# Patient Record
Sex: FEMALE | Race: WHITE | Employment: FULL TIME | URBAN - METROPOLITAN AREA
[De-identification: names, ages, dates, MRNs, and addresses within clinical notes are randomized per-mention and may not be internally consistent; named-entity substitution may affect disease eponyms.]

---

## 2019-04-18 ENCOUNTER — APPOINTMENT (OUTPATIENT)
Dept: CT IMAGING | Age: 64
DRG: 066 | End: 2019-04-18
Payer: COMMERCIAL

## 2019-04-18 ENCOUNTER — HOSPITAL ENCOUNTER (INPATIENT)
Age: 64
LOS: 1 days | Discharge: HOME | DRG: 066 | End: 2019-04-19
Attending: EMERGENCY MEDICINE | Admitting: INTERNAL MEDICINE
Payer: COMMERCIAL

## 2019-04-18 DIAGNOSIS — I63.512 CEREBROVASCULAR ACCIDENT (CVA) DUE TO OCCLUSION OF LEFT MIDDLE CEREBRAL ARTERY (HCC): Primary | ICD-10-CM

## 2019-04-18 PROBLEM — I63.9 ACUTE CVA (CEREBROVASCULAR ACCIDENT) (HCC): Status: ACTIVE | Noted: 2019-04-18

## 2019-04-18 LAB
AMPHETAMINE SCREEN, URINE: NORMAL
ANION GAP SERPL CALCULATED.3IONS-SCNC: 13 MMOL/L (ref 3–16)
BARBITURATE SCREEN URINE: NORMAL
BENZODIAZEPINE SCREEN, URINE: NORMAL
BILIRUBIN URINE: NEGATIVE
BLOOD, URINE: NEGATIVE
BUN BLDV-MCNC: 15 MG/DL (ref 7–20)
CALCIUM SERPL-MCNC: 10.3 MG/DL (ref 8.3–10.6)
CANNABINOID SCREEN URINE: NORMAL
CHLORIDE BLD-SCNC: 98 MMOL/L (ref 99–110)
CLARITY: CLEAR
CO2: 26 MMOL/L (ref 21–32)
COCAINE METABOLITE SCREEN URINE: NORMAL
COLOR: YELLOW
CREAT SERPL-MCNC: <0.5 MG/DL (ref 0.6–1.2)
EKG ATRIAL RATE: 93 BPM
EKG DIAGNOSIS: NORMAL
EKG P AXIS: 22 DEGREES
EKG P-R INTERVAL: 152 MS
EKG Q-T INTERVAL: 380 MS
EKG QRS DURATION: 96 MS
EKG QTC CALCULATION (BAZETT): 472 MS
EKG R AXIS: -8 DEGREES
EKG T AXIS: 50 DEGREES
EKG VENTRICULAR RATE: 93 BPM
GFR AFRICAN AMERICAN: >60
GFR NON-AFRICAN AMERICAN: >60
GLUCOSE BLD-MCNC: 98 MG/DL (ref 70–99)
GLUCOSE URINE: NEGATIVE MG/DL
KETONES, URINE: NEGATIVE MG/DL
LEUKOCYTE ESTERASE, URINE: NEGATIVE
Lab: NORMAL
METHADONE SCREEN, URINE: NORMAL
MICROSCOPIC EXAMINATION: NORMAL
NITRITE, URINE: NEGATIVE
OPIATE SCREEN URINE: NORMAL
OXYCODONE URINE: NORMAL
PH UA: 6
PH UA: 6 (ref 5–8)
PHENCYCLIDINE SCREEN URINE: NORMAL
POTASSIUM REFLEX MAGNESIUM: 4.1 MMOL/L (ref 3.5–5.1)
PROPOXYPHENE SCREEN: NORMAL
PROTEIN UA: NEGATIVE MG/DL
SODIUM BLD-SCNC: 137 MMOL/L (ref 136–145)
SPECIFIC GRAVITY UA: 1.01 (ref 1–1.03)
TROPONIN: <0.01 NG/ML
URINE REFLEX TO CULTURE: NORMAL
URINE TYPE: NORMAL
UROBILINOGEN, URINE: 0.2 E.U./DL

## 2019-04-18 PROCEDURE — 1200000000 HC SEMI PRIVATE

## 2019-04-18 PROCEDURE — 80048 BASIC METABOLIC PNL TOTAL CA: CPT

## 2019-04-18 PROCEDURE — 70496 CT ANGIOGRAPHY HEAD: CPT

## 2019-04-18 PROCEDURE — 6370000000 HC RX 637 (ALT 250 FOR IP): Performed by: STUDENT IN AN ORGANIZED HEALTH CARE EDUCATION/TRAINING PROGRAM

## 2019-04-18 PROCEDURE — 84484 ASSAY OF TROPONIN QUANT: CPT

## 2019-04-18 PROCEDURE — 93005 ELECTROCARDIOGRAM TRACING: CPT | Performed by: EMERGENCY MEDICINE

## 2019-04-18 PROCEDURE — 6360000002 HC RX W HCPCS: Performed by: INTERNAL MEDICINE

## 2019-04-18 PROCEDURE — 70498 CT ANGIOGRAPHY NECK: CPT

## 2019-04-18 PROCEDURE — 6370000000 HC RX 637 (ALT 250 FOR IP): Performed by: INTERNAL MEDICINE

## 2019-04-18 PROCEDURE — 2580000003 HC RX 258: Performed by: INTERNAL MEDICINE

## 2019-04-18 PROCEDURE — 81003 URINALYSIS AUTO W/O SCOPE: CPT

## 2019-04-18 PROCEDURE — 83036 HEMOGLOBIN GLYCOSYLATED A1C: CPT

## 2019-04-18 PROCEDURE — 6360000004 HC RX CONTRAST MEDICATION: Performed by: EMERGENCY MEDICINE

## 2019-04-18 PROCEDURE — 99285 EMERGENCY DEPT VISIT HI MDM: CPT

## 2019-04-18 PROCEDURE — 70450 CT HEAD/BRAIN W/O DYE: CPT

## 2019-04-18 PROCEDURE — 80307 DRUG TEST PRSMV CHEM ANLYZR: CPT

## 2019-04-18 PROCEDURE — 36415 COLL VENOUS BLD VENIPUNCTURE: CPT

## 2019-04-18 RX ORDER — METOPROLOL TARTRATE 50 MG/1
50 TABLET, FILM COATED ORAL 2 TIMES DAILY
Status: ON HOLD | COMMUNITY
End: 2019-04-19 | Stop reason: SDUPTHER

## 2019-04-18 RX ORDER — HYDROCHLOROTHIAZIDE 25 MG/1
25 TABLET ORAL DAILY
COMMUNITY

## 2019-04-18 RX ORDER — ATORVASTATIN CALCIUM 40 MG/1
80 TABLET, FILM COATED ORAL ONCE
Status: COMPLETED | OUTPATIENT
Start: 2019-04-18 | End: 2019-04-18

## 2019-04-18 RX ORDER — NICOTINE 21 MG/24HR
1 PATCH, TRANSDERMAL 24 HOURS TRANSDERMAL DAILY
Status: DISCONTINUED | OUTPATIENT
Start: 2019-04-18 | End: 2019-04-19 | Stop reason: HOSPADM

## 2019-04-18 RX ORDER — LORAZEPAM 2 MG/ML
1 INJECTION INTRAMUSCULAR ONCE
Status: COMPLETED | OUTPATIENT
Start: 2019-04-19 | End: 2019-04-19

## 2019-04-18 RX ORDER — LISINOPRIL AND HYDROCHLOROTHIAZIDE 25; 20 MG/1; MG/1
1 TABLET ORAL DAILY
Status: ON HOLD | COMMUNITY
End: 2019-04-19 | Stop reason: HOSPADM

## 2019-04-18 RX ORDER — ATORVASTATIN CALCIUM 80 MG/1
80 TABLET, FILM COATED ORAL NIGHTLY
Status: DISCONTINUED | OUTPATIENT
Start: 2019-04-19 | End: 2019-04-19 | Stop reason: HOSPADM

## 2019-04-18 RX ORDER — LABETALOL 20 MG/4 ML (5 MG/ML) INTRAVENOUS SYRINGE
10 EVERY 10 MIN PRN
Status: DISCONTINUED | OUTPATIENT
Start: 2019-04-18 | End: 2019-04-19 | Stop reason: HOSPADM

## 2019-04-18 RX ORDER — SODIUM CHLORIDE 9 MG/ML
INJECTION, SOLUTION INTRAVENOUS CONTINUOUS
Status: DISPENSED | OUTPATIENT
Start: 2019-04-18 | End: 2019-04-19

## 2019-04-18 RX ORDER — ASPIRIN 81 MG/1
81 TABLET ORAL DAILY
Status: DISCONTINUED | OUTPATIENT
Start: 2019-04-18 | End: 2019-04-19 | Stop reason: HOSPADM

## 2019-04-18 RX ORDER — SODIUM CHLORIDE 9 MG/ML
INJECTION, SOLUTION INTRAVENOUS CONTINUOUS
Status: DISCONTINUED | OUTPATIENT
Start: 2019-04-18 | End: 2019-04-18

## 2019-04-18 RX ORDER — SODIUM CHLORIDE 0.9 % (FLUSH) 0.9 %
10 SYRINGE (ML) INJECTION EVERY 12 HOURS SCHEDULED
Status: DISCONTINUED | OUTPATIENT
Start: 2019-04-18 | End: 2019-04-19 | Stop reason: HOSPADM

## 2019-04-18 RX ORDER — SODIUM CHLORIDE 0.9 % (FLUSH) 0.9 %
10 SYRINGE (ML) INJECTION PRN
Status: DISCONTINUED | OUTPATIENT
Start: 2019-04-18 | End: 2019-04-19 | Stop reason: HOSPADM

## 2019-04-18 RX ORDER — ATORVASTATIN CALCIUM 40 MG/1
40 TABLET, FILM COATED ORAL NIGHTLY
Status: DISCONTINUED | OUTPATIENT
Start: 2019-04-18 | End: 2019-04-18

## 2019-04-18 RX ORDER — ASPIRIN 81 MG/1
324 TABLET, CHEWABLE ORAL ONCE
Status: COMPLETED | OUTPATIENT
Start: 2019-04-18 | End: 2019-04-18

## 2019-04-18 RX ORDER — ONDANSETRON 2 MG/ML
4 INJECTION INTRAMUSCULAR; INTRAVENOUS EVERY 6 HOURS PRN
Status: DISCONTINUED | OUTPATIENT
Start: 2019-04-18 | End: 2019-04-19 | Stop reason: HOSPADM

## 2019-04-18 RX ADMIN — ENOXAPARIN SODIUM 40 MG: 40 INJECTION SUBCUTANEOUS at 22:13

## 2019-04-18 RX ADMIN — ASPIRIN 81 MG 324 MG: 81 TABLET ORAL at 18:09

## 2019-04-18 RX ADMIN — Medication 10 ML: at 22:13

## 2019-04-18 RX ADMIN — ATORVASTATIN CALCIUM 80 MG: 40 TABLET, FILM COATED ORAL at 18:09

## 2019-04-18 RX ADMIN — IOPAMIDOL 80 ML: 755 INJECTION, SOLUTION INTRAVENOUS at 16:48

## 2019-04-18 RX ADMIN — SODIUM CHLORIDE: 9 INJECTION, SOLUTION INTRAVENOUS at 22:13

## 2019-04-18 ASSESSMENT — ENCOUNTER SYMPTOMS
WHEEZING: 1
TROUBLE SWALLOWING: 0
ABDOMINAL PAIN: 0
CONSTIPATION: 0
VOICE CHANGE: 0
COUGH: 1
CHEST TIGHTNESS: 0
SHORTNESS OF BREATH: 1
BACK PAIN: 1
DIARRHEA: 0
VOMITING: 0

## 2019-04-18 NOTE — ED NOTES
Report given to [facility], RN.       Isis Talbot RN  04/18/19 7112

## 2019-04-18 NOTE — ED PROVIDER NOTES
ED Attending Attestation Note     Date of evaluation: 4/18/2019    This patient was seen by the resident. I have seen and examined the patient, agree with the workup, evaluation, management and diagnosis. The care plan has been discussed. I have reviewed the ECG and concur with the resident's interpretation. My assessment reveals awake alert female who states she has difficulty speaking at times. This started 3 weeks ago.   On exam she is well fluent there is no eighth abrasion noted no pronator drift     Toni Dutton MD  04/18/19 [id number]

## 2019-04-18 NOTE — ED PROVIDER NOTES
1 [facility] ER  EMERGENCY DEPARTMENT ENCOUNTER          [facility] RESIDENT NOTE       Date of evaluation: 4/18/2019    Chief Complaint     Aphasia      History of Present Illness     Davida Denny is a 59 y.o. female w/ PMH of mitral valve dz, HTN, PVD, prediabetes, obesity, tobacco use and cervical + lumbar disc disease (w/o intervention) who presents complaining of a 3 week history of intermittent difficulty with word finding and concentration. Pt states that on 3/28 she had her first episode that lasted <1min. She describes the episodes as forgetting words and/or inability to say the word. Denies this happening previously. Has occurred multiple times since the 28th with most recent episode at work earlier this day. She has had no facial assymmetry, extremity weakness/numbness, or difficulty with ambulation/balance. No fevers, chills, N/V/D, cp, or palpitations. Endorses some SOA and L-arm \"tingling and shocking pains from neck to hand\". L-arm symptoms are chronic since she was diagnosed with cervical DDD. Review of Systems     Review of Systems   Constitutional: Negative for chills, fever and unexpected weight change. HENT: Negative for trouble swallowing and voice change. Eyes: Negative for visual disturbance. Respiratory: Positive for cough, shortness of breath and wheezing (intermittently). Negative for chest tightness. Cardiovascular: Negative for chest pain, palpitations and leg swelling. Gastrointestinal: Negative for abdominal pain, constipation, diarrhea and vomiting. Genitourinary: Negative for dysuria, frequency and urgency. Musculoskeletal: Positive for back pain (chronic ) and neck pain (chronic ). Negative for joint swelling and myalgias. Neurological: Positive for speech difficulty (intermittently ). Negative for dizziness, facial asymmetry, weakness, light-headedness, numbness and headaches. Psychiatric/Behavioral: Positive for decreased concentration.  Negative for agitation, confusion and sleep disturbance. The patient is nervous/anxious. Past Medical, Surgical, Family, and Social History     She has no past medical history on file. She has no past surgical history on file. Her family history is not on file. She     Medications     Previous Medications    No medications on file       Allergies     She is allergic to ciprofloxacin and sulfa antibiotics. Physical Exam     INITIAL VITALS: BP: (!) 240/92, Temp: 98.2 °F (36.8 °C), Pulse: 94, Resp: 24, SpO2: 98 %   Physical Exam   Constitutional: She is oriented to person, place, and time. She appears well-developed and well-nourished. No distress. HENT:   Head: Normocephalic and atraumatic. Right Ear: External ear normal.   Left Ear: External ear normal.   Eyes: Pupils are equal, round, and reactive to light. Conjunctivae and EOM are normal.   Neck: Normal range of motion. Neck supple. No JVD present. No thyromegaly present. Cardiovascular: Normal rate, regular rhythm, S1 normal, S2 normal, normal heart sounds and normal pulses. Exam reveals no gallop. No murmur heard. Pulmonary/Chest: Effort normal. She has no wheezes. She has rhonchi. She has no rales. Abdominal: Soft. Bowel sounds are normal. There is no hepatosplenomegaly. There is no tenderness. Musculoskeletal: Normal range of motion. She exhibits no edema, tenderness or deformity. Neurological: She is alert and oriented to person, place, and time. She has normal strength. No cranial nerve deficit or sensory deficit. She displays a negative Romberg sign. Strength 5/5 in all extremities, normal sensation to light touch throughout, no aphasia or dysarthria, no vision deficits, negative romberg, no pronator drift. Skin: Skin is warm and dry. Capillary refill takes less than 2 seconds. She is not diaphoretic. No erythema. Psychiatric: Her mood appears anxious. Her speech is rapid and/or pressured and tangential. She is hyperactive. carotid arteries with 30% stenosis of the left internal carotid artery bulbar segment and less than 30% stenosis of the right internal carotid artery by NASCET criteria   2. Dominant right vertebral artery. No significant vertebral artery stenosis. CTA HEAD:      ANTERIOR CIRCULATION: The intracranial internal carotid arteries are patent. There is calcification contributing to mild to moderate narrowing of the cavernous segments. The anterior cerebral arteries and the right middle cerebral artery patent. The left    M1 segment is patent. However, there is occlusion or high-grade stenosis of left M2 branch. POSTERIOR CIRCULATION: The intracranial vertebral arteries, basilar artery and branch vessels are patent. INTRACRANIAL VENOUS SYSTEM: No evidence for intracranial venous thrombosis. INTRACRANIAL HEMORRHAGE: None. VENTRICLES: Normal in size and configuration for age. BRAIN PARENCHYMA: No evidence for large territorial acute infarction. No intracranial mass effect. Subcentimeter low attenuation foci involving left cerebral white matter compatible with age indeterminate ischemia unchanged compared to the prior    noncontrast head CT. SKULL: No destructive osseous process or fracture. PARANASAL SINUSES / MASTOIDS: No acute sinusitis or mastoiditis. EXTRACRANIAL SOFT TISSUES: Normal.      IMPRESSION:      1. Occlusion or severe stenosis of left M2 branch origin   2. No large territorial acute infarction      CT Head WO Contrast   Final Result      Subtle patchy decreased attenuation change demonstrated in the left frontoparietal white matter, consistent with ischemia, of uncertain age. Correlate clinically. No other acute intracranial findings.           LABS:   Results for orders placed or performed during the hospital encounter of 85/59/33   Basic Metabolic Panel w/ Reflex to MG   Result Value Ref Range    Sodium 137 136 - 145 mmol/L    Potassium reflex Magnesium 4.1 3.5 - 5.1 mmol/L    Chloride 98 (L) 99 - 110 mmol/L    CO2 26 21 - 32 mmol/L    Anion Gap 13 3 - 16    Glucose 98 70 - 99 mg/dL    BUN 15 7 - 20 mg/dL    CREATININE <0.5 (L) 0.6 - 1.2 mg/dL    GFR Non-African American >60 >60    GFR African American >60 >60    Calcium 10.3 8.3 - 10.6 mg/dL   Urinalysis Reflex to Culture   Result Value Ref Range    Color, UA Yellow Straw/Yellow    Clarity, UA Clear Clear    Glucose, Ur Negative Negative mg/dL    Bilirubin Urine Negative Negative    Ketones, Urine Negative Negative mg/dL    Specific Gravity, UA 1.015 1.005 - 1.030    Blood, Urine Negative Negative    pH, UA 6.0 5.0 - 8.0    Protein, UA Negative Negative mg/dL    Urobilinogen, Urine 0.2 <2.0 E.U./dL    Nitrite, Urine Negative Negative    Leukocyte Esterase, Urine Negative Negative    Microscopic Examination Not Indicated     Urine Reflex to Culture Not Indicated     Urine Type Voided    Troponin   Result Value Ref Range    Troponin <0.01 <0.01 ng/mL   Urine Drug Screen   Result Value Ref Range    Amphetamine Screen, Urine Neg Negative <1000ng/mL    Barbiturate Screen, Ur Neg Negative <200 ng/mL    Benzodiazepine Screen, Urine Neg Negative <200 ng/mL    Cannabinoid Scrn, Ur Neg Negative <50 ng/mL    Cocaine Metabolite Screen, Urine Neg Negative <300 ng/mL    Opiate Scrn, Ur Neg Negative <300 ng/mL    PCP Screen, Urine Neg Negative <25 ng/mL    Methadone Screen, Urine Neg Negative <300 ng/mL    Propoxyphene Scrn, Ur Neg Negative <300 ng/mL    pH, UA 6.0     Drug Screen Comment: see below     Oxycodone Urine Neg Negative <100 ng/ml   EKG 12 Lead   Result Value Ref Range    Ventricular Rate 93 BPM    Atrial Rate 93 BPM    P-R Interval 152 ms    QRS Duration 96 ms    Q-T Interval 380 ms    QTc Calculation (Bazett) 472 ms    P Axis 22 degrees    R Axis -8 degrees    T Axis 50 degrees    Diagnosis       EKG performed in ER and to be interpreted by ER physician. Confirmed by MD, ER ([id number]),  Chad Martin ([phone]) on MEDICATIONS:  New Prescriptions    No medications on file       Anita Neil 172, DO  Resident  04/18/19 6202

## 2019-04-18 NOTE — ED TRIAGE NOTES
PT ARRIVED TO ed WITH MULTIPLE COMPLAINTS.  PT STATES IT STARTED 3 WEEK AGO AND HAS BEEN INCREASING IN FREQUENCY-SHE IS HAVING MEMORY PROBLEMS AND CANNOT GET HER WORDS OUT

## 2019-04-18 NOTE — FLOWSHEET NOTE
04/18/19 1533   Encounter Summary   Services provided to: Patient   Referral/Consult From: Rounding   Continue Visiting   (Seen 4/18/19, AYLIN. )   Complexity of Encounter Moderate   Length of Encounter 15 minutes   Routine   Type Initial   Assessment Approachable   Intervention Nurtured hope   Outcome Expressed gratitude

## 2019-04-18 NOTE — H&P
Hospital Medicine History & Physical      PCP: Aleida Fernandez MD    Date of Admission: 4/18/2019    Date of Service: Pt seen/examined on 4/18/2019 and Admitted to Inpatient with expected LOS greater than two midnights due to medical therapy. Chief Complaint:  Slurred speech      History Of Present Illness:    59 y.o. female with PMH of MVP, hx of endocarditis following a dental procedure at age of 24 years, HTN, PVD, prediabetes, obesity, tobacco use and severe cervical + lumbar disc disease ( which is inoperable)  with chronic BL leg weakness making her gait and ambulation abnormal, presents complaining of a 3 week history of intermittent difficulty with word finding and concentration. Pt states that on 3/28 she had her first episode that lasted <1min. She was in [employer] [facility] [employer] at a convention. She describes the episodes as forgetting words and/or inability to say the word. Has occurred multiple times since the 28th with most recent episode at work earlier today. Pt states it initially happened every week, and lasted for few seconds and now it happens more frequently and lasts longer. Pt was seen at [facility] few days ago, but was not able to see her PCP  She works as a  for [employer] and states that her speech is very important for her to keep the job, tearful    She has had no facial assymmetry, extremity weakness/numbness, or difficulty with ambulation/balance. No fevers, chills, N/V/D, chest pain, SOB, or palpitations. Endorses \"tingling and shocking pains from neck to left hand\", which started about 1-2 weeks ago, but according to the pt she has had \" bad neck\" due to severeare cervical DDD, which was resolved with wearing a soft neck collar and PT.     Pt was told that she has PVD     Smoker for many years, smokes > 10 cigarettes a day    Past Medical History:          Diagnosis Date    Diverticulitis        Past Surgical History:      No past surgical history on file.    Medications Prior to Admission:      Prior to Admission medications    Not on File       Allergies:  Ciprofloxacin and Sulfa antibiotics    Social History:    TOBACCO:   has no tobacco history on file. ETOH:   has no alcohol history on file. Family History:    Reviewed in detail and negative for DM, CAD, Cancer, CVA. Positive as follows:    No family history on file. REVIEW OF SYSTEMS:   Pertinent positives as noted in the HPI. All other systems reviewed and negative. PHYSICAL EXAM PERFORMED:    BP (!) 229/115   Pulse 96   Temp 98.2 °F (36.8 °C) (Oral)   Resp 17   Ht 5' 2\" (1.575 m)   Wt 194 lb (88 kg)   SpO2 96%   BMI 35.48 kg/m²     General appearance:  No apparent distress, appears stated age and cooperative. HEENT:  Normal cephalic, atraumatic without obvious deformity. Pupils equal, round, and reactive to light. Extra ocular muscles intact. Conjunctivae/corneas clear. Neck: Supple, with full range of motion. No jugular venous distention. Trachea midline. Respiratory:  Normal respiratory effort. Clear to auscultation, bilaterally without Rales/Wheezes/Rhonchi. Cardiovascular:  Regular rate and rhythm with normal S1/S2 without murmurs, rubs or gallops. Abdomen: Soft, non-tender, non-distended with normal bowel sounds. Musculoskeletal:  No clubbing, cyanosis or edema bilaterally. Full range of motion without deformity. Skin: Skin color, texture, turgor normal.  No rashes or lesions. Neurologic:  Neurovascularly intact without any focal sensory/motor deficits.  Cranial nerves: II-XII intact, grossly non-focal. Speech- normal most of the time during conversation except 1-2 times when she had difficulty what she wanted to express  Psychiatric:  Alert and oriented, thought content appropriate, normal insight  Capillary Refill: Brisk,< 3 seconds   Peripheral Pulses: +2 palpable, equal bilaterally       Labs:     No results for input(s): WBC, HGB, HCT, PLT in the last 72 hours. Recent Labs     04/18/19  1504      K 4.1   CL 98*   CO2 26   BUN 15   CREATININE <0.5*   CALCIUM 10.3     No results for input(s): AST, ALT, BILIDIR, BILITOT, ALKPHOS in the last 72 hours. No results for input(s): INR in the last 72 hours. Recent Labs     04/18/19  1504   TROPONINI <0.01       Urinalysis:      Lab Results   Component Value Date    NITRU Negative 04/18/2019    BLOODU Negative 04/18/2019    SPECGRAV 1.015 04/18/2019    GLUCOSEU Negative 04/18/2019       Radiology:     CXR: I have reviewed the CXR with the following interpretation: NA  EKG:  I have reviewed the EKG with the following interpretation: SR, normal rate and intervals, no acute ST-T changes    CTA NECK W CONTRAST   Final Result      1. Tortuous bilateral internal carotid arteries with 30% stenosis of the left internal carotid artery bulbar segment and less than 30% stenosis of the right internal carotid artery by NASCET criteria   2. Dominant right vertebral artery. No significant vertebral artery stenosis. CTA HEAD:      ANTERIOR CIRCULATION: The intracranial internal carotid arteries are patent. There is calcification contributing to mild to moderate narrowing of the cavernous segments. The anterior cerebral arteries and the right middle cerebral artery patent. The left    M1 segment is patent. However, there is occlusion or high-grade stenosis of left M2 branch. POSTERIOR CIRCULATION: The intracranial vertebral arteries, basilar artery and branch vessels are patent. INTRACRANIAL VENOUS SYSTEM: No evidence for intracranial venous thrombosis. INTRACRANIAL HEMORRHAGE: None. VENTRICLES: Normal in size and configuration for age. BRAIN PARENCHYMA: No evidence for large territorial acute infarction. No intracranial mass effect. Subcentimeter low attenuation foci involving left cerebral white matter compatible with age indeterminate ischemia unchanged compared to the prior    noncontrast head CT. SKULL: No destructive osseous process or fracture. PARANASAL SINUSES / MASTOIDS: No acute sinusitis or mastoiditis. EXTRACRANIAL SOFT TISSUES: Normal.      IMPRESSION:      1. Occlusion or severe stenosis of left M2 branch origin   2. No large territorial acute infarction      CTA HEAD W CONTRAST   Final Result      1. Tortuous bilateral internal carotid arteries with 30% stenosis of the left internal carotid artery bulbar segment and less than 30% stenosis of the right internal carotid artery by NASCET criteria   2. Dominant right vertebral artery. No significant vertebral artery stenosis. CTA HEAD:      ANTERIOR CIRCULATION: The intracranial internal carotid arteries are patent. There is calcification contributing to mild to moderate narrowing of the cavernous segments. The anterior cerebral arteries and the right middle cerebral artery patent. The left    M1 segment is patent. However, there is occlusion or high-grade stenosis of left M2 branch. POSTERIOR CIRCULATION: The intracranial vertebral arteries, basilar artery and branch vessels are patent. INTRACRANIAL VENOUS SYSTEM: No evidence for intracranial venous thrombosis. INTRACRANIAL HEMORRHAGE: None. VENTRICLES: Normal in size and configuration for age. BRAIN PARENCHYMA: No evidence for large territorial acute infarction. No intracranial mass effect. Subcentimeter low attenuation foci involving left cerebral white matter compatible with age indeterminate ischemia unchanged compared to the prior    noncontrast head CT. SKULL: No destructive osseous process or fracture. PARANASAL SINUSES / MASTOIDS: No acute sinusitis or mastoiditis. EXTRACRANIAL SOFT TISSUES: Normal.      IMPRESSION:      1. Occlusion or severe stenosis of left M2 branch origin   2.  No large territorial acute infarction      CT Head WO Contrast   Final Result      Subtle patchy decreased attenuation change demonstrated in the left frontoparietal white matter, consistent with ischemia, of uncertain age. Correlate clinically. No other acute intracranial findings. ASSESSMENT:    Active Hospital Problems    Diagnosis Date Noted    Acute CVA (cerebrovascular accident) ([facility] [address] [I63.9] 04/18/2019   Sub acute CVA given her symptoms started 3 weeks ago  Left frontoparietal ischemic stroke of undetermined age  Occlusion or severe stenosis of left M2 branch origin  HTN  PVD - likely 2 to tob  Pre-diabetes  Tobacco abuse  Obesity  Severe lumbar and cervical spine DDD    PLAN:  Since symptom on set is 3 weeks ago, not a t-PA candidate or IV heparin   mg stat given  Continue ASA daily and start on statins  Lipid panel AM  MRI brain, ECHO in AM  Neurology consult  PT/OT/SLP  Maintain BP ~ 180/90  IV prn Labetalol with parameters for BP  Continue her home BP medications, hold HCTZ  Check A1C  OP work up for cervical radiculopathy    DVT Prophylaxis: Lovenox  Diet: Diet NPO, After Midnight  DIET CARDIAC;  Code Status: Full Code    PT/OT Eval Status: Yes    Dana Kerr MD    Thank you Dieter Bonds MD for the opportunity to be involved in this patient's care. If you have any questions or concerns please feel free to contact me at [phone].

## 2019-04-18 NOTE — ED NOTES
Pt hooked up to monitor, went into room and pt had taken her BP cuff off and pulse ox. Pt walking around room calling her children. No signs of distress, pt educated on monitor and still takes it off because the bp cuff \"gets too tight. \"      Ira Du RN  04/18/19 [id number]

## 2019-04-19 ENCOUNTER — APPOINTMENT (OUTPATIENT)
Dept: CT IMAGING | Age: 64
DRG: 066 | End: 2019-04-19
Payer: COMMERCIAL

## 2019-04-19 ENCOUNTER — APPOINTMENT (OUTPATIENT)
Dept: MRI IMAGING | Age: 64
DRG: 066 | End: 2019-04-19
Payer: COMMERCIAL

## 2019-04-19 ENCOUNTER — APPOINTMENT (OUTPATIENT)
Dept: GENERAL RADIOLOGY | Age: 64
DRG: 066 | End: 2019-04-19
Payer: COMMERCIAL

## 2019-04-19 VITALS
HEART RATE: 71 BPM | WEIGHT: 194 LBS | DIASTOLIC BLOOD PRESSURE: 75 MMHG | RESPIRATION RATE: 16 BRPM | HEIGHT: 62 IN | TEMPERATURE: 97.9 F | SYSTOLIC BLOOD PRESSURE: 130 MMHG | OXYGEN SATURATION: 96 % | BODY MASS INDEX: 35.7 KG/M2

## 2019-04-19 PROBLEM — Z72.0 TOBACCO ABUSE: Status: ACTIVE | Noted: 2019-04-19

## 2019-04-19 PROBLEM — E78.5 HYPERLIPEMIA: Status: ACTIVE | Noted: 2019-04-19

## 2019-04-19 PROBLEM — I10 PRIMARY HYPERTENSION: Status: ACTIVE | Noted: 2019-04-19

## 2019-04-19 LAB
CHOLESTEROL, TOTAL: 244 MG/DL (ref 0–199)
ESTIMATED AVERAGE GLUCOSE: 125.5 MG/DL
HBA1C MFR BLD: 6 %
HDLC SERPL-MCNC: 58 MG/DL (ref 40–60)
LDL CHOLESTEROL CALCULATED: 164 MG/DL
LV EF: 58 %
LVEF MODALITY: NORMAL
TRIGL SERPL-MCNC: 110 MG/DL (ref 0–150)
VLDLC SERPL CALC-MCNC: 22 MG/DL

## 2019-04-19 PROCEDURE — 97530 THERAPEUTIC ACTIVITIES: CPT

## 2019-04-19 PROCEDURE — 0042T CT BRAIN PERFUSION: CPT

## 2019-04-19 PROCEDURE — 97116 GAIT TRAINING THERAPY: CPT

## 2019-04-19 PROCEDURE — 6360000002 HC RX W HCPCS: Performed by: INTERNAL MEDICINE

## 2019-04-19 PROCEDURE — 6370000000 HC RX 637 (ALT 250 FOR IP): Performed by: INTERNAL MEDICINE

## 2019-04-19 PROCEDURE — 97165 OT EVAL LOW COMPLEX 30 MIN: CPT

## 2019-04-19 PROCEDURE — 6360000004 HC RX CONTRAST MEDICATION: Performed by: INTERNAL MEDICINE

## 2019-04-19 PROCEDURE — 99254 IP/OBS CNSLTJ NEW/EST MOD 60: CPT | Performed by: INTERNAL MEDICINE

## 2019-04-19 PROCEDURE — 92610 EVALUATE SWALLOWING FUNCTION: CPT

## 2019-04-19 PROCEDURE — 97161 PT EVAL LOW COMPLEX 20 MIN: CPT

## 2019-04-19 PROCEDURE — 72040 X-RAY EXAM NECK SPINE 2-3 VW: CPT

## 2019-04-19 PROCEDURE — 97535 SELF CARE MNGMENT TRAINING: CPT

## 2019-04-19 PROCEDURE — 36415 COLL VENOUS BLD VENIPUNCTURE: CPT

## 2019-04-19 PROCEDURE — 6370000000 HC RX 637 (ALT 250 FOR IP): Performed by: PSYCHIATRY & NEUROLOGY

## 2019-04-19 PROCEDURE — C8929 TTE W OR WO FOL WCON,DOPPLER: HCPCS

## 2019-04-19 PROCEDURE — 80061 LIPID PANEL: CPT

## 2019-04-19 PROCEDURE — 70551 MRI BRAIN STEM W/O DYE: CPT

## 2019-04-19 RX ORDER — LISINOPRIL 20 MG/1
20 TABLET ORAL DAILY
Status: DISCONTINUED | OUTPATIENT
Start: 2019-04-19 | End: 2019-04-19 | Stop reason: HOSPADM

## 2019-04-19 RX ORDER — NICOTINE 21 MG/24HR
1 PATCH, TRANSDERMAL 24 HOURS TRANSDERMAL DAILY
Qty: 30 PATCH | Refills: 3 | Status: SHIPPED | OUTPATIENT
Start: 2019-04-19

## 2019-04-19 RX ORDER — ROSUVASTATIN CALCIUM 20 MG/1
20 TABLET, COATED ORAL NIGHTLY
Qty: 30 TABLET | Refills: 1 | Status: SHIPPED | OUTPATIENT
Start: 2019-04-19

## 2019-04-19 RX ORDER — CLOPIDOGREL 300 MG/1
600 TABLET, FILM COATED ORAL ONCE
Status: COMPLETED | OUTPATIENT
Start: 2019-04-19 | End: 2019-04-19

## 2019-04-19 RX ORDER — CLOPIDOGREL BISULFATE 75 MG/1
75 TABLET ORAL DAILY
Qty: 30 TABLET | Refills: 3 | Status: SHIPPED | OUTPATIENT
Start: 2019-04-20

## 2019-04-19 RX ORDER — ASPIRIN 81 MG/1
81 TABLET ORAL DAILY
Qty: 30 TABLET | Refills: 3 | Status: SHIPPED | OUTPATIENT
Start: 2019-04-20

## 2019-04-19 RX ORDER — METOPROLOL TARTRATE 50 MG/1
25 TABLET, FILM COATED ORAL 2 TIMES DAILY
Qty: 60 TABLET | Refills: 3 | Status: SHIPPED | OUTPATIENT
Start: 2019-04-19

## 2019-04-19 RX ORDER — CLOPIDOGREL BISULFATE 75 MG/1
75 TABLET ORAL DAILY
Status: DISCONTINUED | OUTPATIENT
Start: 2019-04-20 | End: 2019-04-19 | Stop reason: HOSPADM

## 2019-04-19 RX ORDER — LISINOPRIL 20 MG/1
20 TABLET ORAL DAILY
Qty: 30 TABLET | Refills: 3 | Status: SHIPPED | OUTPATIENT
Start: 2019-04-20

## 2019-04-19 RX ADMIN — ENOXAPARIN SODIUM 40 MG: 40 INJECTION SUBCUTANEOUS at 12:52

## 2019-04-19 RX ADMIN — LISINOPRIL 20 MG: 20 TABLET ORAL at 08:54

## 2019-04-19 RX ADMIN — METOPROLOL TARTRATE 25 MG: 25 TABLET ORAL at 04:47

## 2019-04-19 RX ADMIN — METOPROLOL TARTRATE 25 MG: 25 TABLET ORAL at 08:54

## 2019-04-19 RX ADMIN — LORAZEPAM 1 MG: 2 INJECTION INTRAMUSCULAR; INTRAVENOUS at 11:13

## 2019-04-19 RX ADMIN — ASPIRIN 81 MG: 81 TABLET, COATED ORAL at 12:56

## 2019-04-19 RX ADMIN — IOPAMIDOL 45 ML: 755 INJECTION, SOLUTION INTRAVENOUS at 15:11

## 2019-04-19 RX ADMIN — CLOPIDOGREL BISULFATE 600 MG: 300 TABLET, FILM COATED ORAL at 12:56

## 2019-04-19 SDOH — HEALTH STABILITY: MENTAL HEALTH: HOW OFTEN DO YOU HAVE A DRINK CONTAINING ALCOHOL?: MONTHLY OR LESS

## 2019-04-19 NOTE — PROGRESS NOTES
Speech Language Pathology  Facility/Department: [id number] [facility] EVALUATION / DISCHARGE    NAME: Mando Kiser  : 1955  MRN: [id number]    ADMISSION DATE: 2019  ADMITTING DIAGNOSIS: has Acute CVA (cerebrovascular accident) ([address] Primary hypertension; Hyperlipemia; and Tobacco abuse on their problem list.  ONSET DATE: 19    Recent Chest Xray/CT of Chest: None    Date of Eval: 2019  Evaluating Therapist: Lorna Flores    Current Diet level:  Current Diet : Regular  Current Liquid Diet : Thin    Primary Complaint :  Difficult speaking. Pain: Denied at time of session       Reason for Referral  Mando Kiser was referred for a bedside swallow evaluation to assess the efficiency of her swallow function, identify signs and symptoms of aspiration and make recommendations regarding safe dietary consistencies, effective compensatory strategies, and safe eating environment. Impression  Dysphagia Diagnosis: Swallow function appears grossly intact  Dysphagia Impression : No s/s of dysphagia or aspiration. Patient did not complete 3 ounce water test as she continued to stop drinking despite directions. Patient presents with aphasia. Recommend OP speech therapy. Remyve sent to MD re: need for SLP Eval and Treat order for language eval.   Dysphagia Outcome Severity Scale: Level 7: Normal in all situations     Treatment Plan  Requires SLP Intervention: No  Duration/Frequency of Treatment: N/A  D/C Recommendations: Home independently     Recommended Diet and Intervention  Diet Solids Recommendation: Regular  Liquid Consistency Recommendation:  Thin  Recommended Form of Meds: PO     Compensatory Swallowing Strategies: None indicated      Treatment/Goals  Short-term Goals  Timeframe for Short-term Goals: Pt goal: To go home  Long-term Goals  Timeframe for Long-term Goals: N/A    General  Chart Reviewed: Yes  Comments: Pt admitted yesterday secondary to few weeks of difficulty speaking. Imaging revealed thromboembolic infarctions involving left cerebral white matter. Behavior/Cognition: Alert; Cooperative;Pleasant mood;Agitated  Respiratory Status: Room air  O2 Device: None (Room air)  Communication Observation: Aphasia(acute aphasia)  Follows Directions: Simple  Dentition: Adequate; Some missing teeth  Patient Positioning: Upright in bed  Baseline Vocal Quality: Normal  Volitional Cough: Congested  Volitional Swallow: (reduced laryngeal elevation on palpation)  Prior Dysphagia History: None per patient; restricts some things due to missing dentition  Consistencies Administered: Mechanical soft; Thin - cup; Thin - straw    Oral Motor Deficits  Oral/Motor  Oral Motor: Within functional limits    Oral Phase Dysfunction  Oral Phase  Oral Phase: WFL     Indicators of Pharyngeal Phase Dysfunction   Pharyngeal Phase  Pharyngeal Phase: WFL    Prognosis  Individuals consulted  Consulted and agree with results and recommendations: Patient;RN    Education  Patient Education: Educated on rationale for BSE, relationship between dysphagia and CVA and dysphagia and consequential pneumonia, s/s of aspiration.    Patient Education Response: Verbalizes understanding  Safety Devices in place: Yes  Type of devices: Call light within reach       Therapy Time  SLP Total Treatment Time  Timed Code Treatment Minutes: 0 Minutes  Total Treatment Time: RUSSELL Freire Travisfort  Speech-Language Pathologist

## 2019-04-19 NOTE — PROGRESS NOTES
Pt was threatening to leave AMA and was very upset. Now has been D/Allen and is very pleasant and cooperative. Pt verbalized understanding of D/C and medication instructions. Condition good and goals met at D/C.

## 2019-04-19 NOTE — PLAN OF CARE
Bedside swallow evaluation completed. Please see report in EMR.      Constantino Bryan M.A., Nik  Speech-Language Pathologist

## 2019-04-19 NOTE — CONSULTS
Neurology Consult Note  Reason for Consult: stroke  Chief complaint: speech difficulty  Dr Jose Guadalupe Chaudhari MD asked me to see Frank Benavidez in consultation for evaluation of stroke    History of Present Illness:  Frank Benavidez is a 59 y.o. female who presents with speech difficulty noted 3/28 while at a conference approximatly 5 minutes of talking incorrectly. This resolved but she didn't feel back to normal the entire next day. Last week she had another episode that resolved. On wed she had 45 minute event where she couldn't speak without weakness. This improved but didn't resolve. She doesn't feel like she fully recovered from this. She was not taking asa or statin. She reports statin intolerance. She takes BP meds and reports refractory hypertension as well as pre-eclampsia. Medical History:  Past Medical History:   Diagnosis Date    DDD (degenerative disc disease), cervical     Diverticulitis     Hypertension     PVD (peripheral vascular disease) ([facility] [address]      No past surgical history on file. Medications Prior to Admission: DICLOFENAC PO, Take 50 mg by mouth 3 times daily as needed  hydrochlorothiazide (HYDRODIURIL) 25 MG tablet, Take 25 mg by mouth daily  lisinopril-hydrochlorothiazide (PRINZIDE;ZESTORETIC) 20-25 MG per tablet, Take 1 tablet by mouth daily Pt states she only takes half a pill once a day. metoprolol tartrate (LOPRESSOR) 50 MG tablet, Take 50 mg by mouth 2 times daily Pt takes half a pill 2x a day. So 25 mg BID. Allergies   Allergen Reactions    Ciprofloxacin     Sulfa Antibiotics      No family history on file. Social History     Tobacco Use   Smoking Status Not on file     Social History     Substance and Sexual Activity   Drug Use Not on file     Social History     Substance and Sexual Activity   Alcohol Use Not on file       ROS:  Constitutional- No weight loss or fevers  Eyes- No diplopia. No photophobia.   Ears/nose/throat- No dysphagia. + Dysarthria  Cardiovascular- No palpitations. No chest pain  Respiratory- No dyspnea. No Cough  Gastrointestinal- No Abdominal pain. No Vomiting. Genitourinary- No incontinence. No urinary retention  Musculoskeletal- No myalgia. No arthralgia  Skin- No rash. No easy bruising. Psychiatric- No depression. No anxiety  Endocrine- No diabetes. No thyroid issues. Hematologic- No bleeding difficulty. No fatigue  Neurologic- No weakness. No Headache. Exam:  Blood pressure (!) 197/78, pulse 70, temperature 97.8 °F (36.6 °C), temperature source Oral, resp. rate 16, height 5' 2\" (1.575 m), weight 194 lb (88 kg), SpO2 98 %. Constitutional    Vital signs: BP, HR, and RR reviewed   General Alert, no distress, well-nourished  Eyes: fundoscopic exam revealed no hemorrhage   Cardiovascular: pulses symmetric in all 4 extremities. No peripheral edema. Psychiatric: cooperative with examination, no  psychotic behavior noted. Neurologic  Mental status:   orientation to person and place   General fund of knowledge grossly intact   Memory grossly intact   Attention intact as able to attend well to the exam     Language fluent in conversation. Able to repeat sentence and name wrist watch. Comprehension intact; follows simple commands  Cranial nerves:   CN2: Visual Fields full w/o extinction on confrontational testing,   CN 3,4,6: extraocular muscles intact,  CN5: facial sensation symmetric   CN7:face symmetric without dysarthria,   CN8: hearing grossly intact  CN9: palate elevated symmetrically  CN11: trap full strength on shoulder shrug  CN12: tongue midline with protrusion  Strength: No prontator drift in upper limbs. 4/5 in lower limbs bilaterally that is somewhat effort dependent. Deep tendon reflexes: normal in all 4 extremities  Sensory: light touch intact in all 4 extremities.   No sensory extinction on double simultaneous stimulation  Cerebellar/coordination: finger nose finger normal without ataxia  Tone: normal in all 4 extremities  Gait: deferred for safety      Labs  98 glucose    Utox negative    Studies    Head CT              Impression:  Perlita North is a 59 y.o. female who presented with recurrent left hemisphere TIA and stroke noted on CT with left carotid stenosis with soft plaque and calcifications that is not severe as well as filling defect in proximal left M2. She is very high risk recurrent stroke. Recommendations:  -Plavix load today 600 mg followed by 75 daily with asa 81 daily for 90 days followed by asa 325 or plavix 75 daily monotherapy after 90 days  -I strongly encouraged statin at least for few months as well as smoking cessation  -[place] BP for now.   Only treat greater than 220/120 for the next 24 hours and then slowly lower pending exam.    A copy of this note was provided for MD Isaac Pope MD  [phone]  Evenings, weekends, and off weeks please discuss neurologist on-call    CC=45 min

## 2019-04-19 NOTE — PLAN OF CARE
Problem: Pain:  Goal: Pain level will decrease  Description  Pt resting comfortably, has chronic back pain. Outcome: Ongoing     Problem: Falls - Risk of:  Goal: Will remain free from falls  Description  Fall precautions in place. Bed is in lowest position, wheels locked, alarm on, non-skid socks on. Call light and bedside table within reach. Patient calls out appropriately. Patient is up x1 assist. Will continue to assess and monitor. Outcome: Ongoing     Problem: COMMUNICATION IMPAIRMENT  Goal: Ability to express needs and understand communication  Pt has some slight expressive aphasia, slight problems word findings, pt communicates well.   Outcome: Ongoing

## 2019-04-19 NOTE — PROGRESS NOTES
Hospitalist Progress Note      PCP: Miguel Angel Gibbs MD    Date of Admission: 4/18/2019    Chief Complaint: trouble finding words    Hospital Course: admitted with expressive aphasia. Left hemispheric CVA diagnosed. Workup in process. Neurologic abnormalities noted to resolve    Subjective: no more aphasia. No chest pain, no shortness of breath, no nausea or vomiting        Medications:  Reviewed    Infusion Medications   Scheduled Medications    lisinopril  20 mg Oral Daily    metoprolol tartrate  25 mg Oral BID    [START ON 4/20/2019] clopidogrel  75 mg Oral Daily    sodium chloride flush  10 mL Intravenous 2 times per day    enoxaparin  40 mg Subcutaneous Daily    aspirin  81 mg Oral Daily    atorvastatin  80 mg Oral Nightly    nicotine  1 patch Transdermal Daily     PRN Meds: sodium chloride flush, magnesium hydroxide, ondansetron, labetalol      Intake/Output Summary (Last 24 hours) at 4/19/2019 1412  Last data filed at 4/18/2019 2338  Gross per 24 hour   Intake 480 ml   Output --   Net 480 ml       Physical Exam Performed:    BP (!) 155/112   Pulse 69   Temp 98.3 °F (36.8 °C) (Oral)   Resp 16   Ht 5' 2\" (1.575 m)   Wt 194 lb (88 kg)   SpO2 96%   BMI 35.48 kg/m²     General appearance: No apparent distress, appears stated age and cooperative. HEENT: Pupils equal, round, and reactive to light. Conjunctivae/corneas clear. Neck: Supple, with full range of motion. No jugular venous distention. Trachea midline. Respiratory:  Normal respiratory effort. Clear to auscultation, bilaterally without Rales/Wheezes/Rhonchi. Cardiovascular: Regular rate and rhythm with normal S1/S2 without murmurs, rubs or gallops. Abdomen: Soft, non-tender, non-distended with normal bowel sounds. Musculoskeletal: No clubbing, cyanosis or edema bilaterally. Full range of motion without deformity. Skin: Skin color, texture, turgor normal.  No rashes or lesions.   Neurologic:  Neurovascularly intact without any focal sensory/motor deficits. Cranial nerves: II-XII intact, grossly non-focal.  Psychiatric: Alert and oriented, thought content appropriate, normal insight  Capillary Refill: Brisk,< 3 seconds   Peripheral Pulses: +2 palpable, equal bilaterally       Labs:   No results for input(s): WBC, HGB, HCT, PLT in the last 72 hours. Recent Labs     04/18/19  1504      K 4.1   CL 98*   CO2 26   BUN 15   CREATININE <0.5*   CALCIUM 10.3     No results for input(s): AST, ALT, BILIDIR, BILITOT, ALKPHOS in the last 72 hours. No results for input(s): INR in the last 72 hours. Recent Labs     04/18/19  1504   TROPONINI <0.01       Urinalysis:      Lab Results   Component Value Date    NITRU Negative 04/18/2019    BLOODU Negative 04/18/2019    SPECGRAV 1.015 04/18/2019    GLUCOSEU Negative 04/18/2019       Radiology:  MRI BRAIN WO CONTRAST   Final Result      1. Multiple acute thromboembolic infarctions involving left cerebral white matter, predominantly deep watershed territories. 2. No intracranial hemorrhage or mass               XR CERVICAL SPINE (2-3 VIEWS)   Final Result      Extensive multilevel degenerative disc disease and facet arthropathy. CTA NECK W CONTRAST   Final Result      1. Tortuous bilateral internal carotid arteries with 30% stenosis of the left internal carotid artery bulbar segment and less than 30% stenosis of the right internal carotid artery by NASCET criteria   2. Dominant right vertebral artery. No significant vertebral artery stenosis. CTA HEAD:      ANTERIOR CIRCULATION: The intracranial internal carotid arteries are patent. There is calcification contributing to mild to moderate narrowing of the cavernous segments. The anterior cerebral arteries and the right middle cerebral artery patent. The left    M1 segment is patent. However, there is occlusion or high-grade stenosis of left M2 branch.       POSTERIOR CIRCULATION: The intracranial vertebral arteries, basilar artery and branch vessels are patent. INTRACRANIAL VENOUS SYSTEM: No evidence for intracranial venous thrombosis. INTRACRANIAL HEMORRHAGE: None. VENTRICLES: Normal in size and configuration for age. BRAIN PARENCHYMA: No evidence for large territorial acute infarction. No intracranial mass effect. Subcentimeter low attenuation foci involving left cerebral white matter compatible with age indeterminate ischemia unchanged compared to the prior    noncontrast head CT. SKULL: No destructive osseous process or fracture. PARANASAL SINUSES / MASTOIDS: No acute sinusitis or mastoiditis. EXTRACRANIAL SOFT TISSUES: Normal.      IMPRESSION:      1. Occlusion or severe stenosis of left M2 branch origin   2. No large territorial acute infarction      CTA HEAD W CONTRAST   Final Result      1. Tortuous bilateral internal carotid arteries with 30% stenosis of the left internal carotid artery bulbar segment and less than 30% stenosis of the right internal carotid artery by NASCET criteria   2. Dominant right vertebral artery. No significant vertebral artery stenosis. CTA HEAD:      ANTERIOR CIRCULATION: The intracranial internal carotid arteries are patent. There is calcification contributing to mild to moderate narrowing of the cavernous segments. The anterior cerebral arteries and the right middle cerebral artery patent. The left    M1 segment is patent. However, there is occlusion or high-grade stenosis of left M2 branch. POSTERIOR CIRCULATION: The intracranial vertebral arteries, basilar artery and branch vessels are patent. INTRACRANIAL VENOUS SYSTEM: No evidence for intracranial venous thrombosis. INTRACRANIAL HEMORRHAGE: None. VENTRICLES: Normal in size and configuration for age. BRAIN PARENCHYMA: No evidence for large territorial acute infarction. No intracranial mass effect.  Subcentimeter low attenuation foci involving left cerebral white matter compatible with age indeterminate ischemia unchanged compared to the prior    noncontrast head CT. SKULL: No destructive osseous process or fracture. PARANASAL SINUSES / MASTOIDS: No acute sinusitis or mastoiditis. EXTRACRANIAL SOFT TISSUES: Normal.      IMPRESSION:      1. Occlusion or severe stenosis of left M2 branch origin   2. No large territorial acute infarction      CT Head WO Contrast   Final Result      Subtle patchy decreased attenuation change demonstrated in the left frontoparietal white matter, consistent with ischemia, of uncertain age. Correlate clinically. No other acute intracranial findings.       CT HEAD W CONTRAST    (Results Pending)           Assessment/Plan:    Active Hospital Problems    Diagnosis Date Noted    Primary hypertension [I10] 04/19/2019    Hyperlipemia [E78.5] 04/19/2019    Tobacco abuse [Z72.0] 04/19/2019    Acute CVA (ce[facility]brovascular accident) [facility]) [I63.9] 04/18/2019     PLAN:    Recurrent left hemisphere CVA  Seen by neurology  Started on ASA & Plavix  Started statin  Needs to stop smoking  PT/OT  Cardiology consulted for arrhythmia evaluation    Dyslipidemia  Started on statin    Tobacco abuse  Counseled to stop smoking    Expressive aphasia  Resolved     D/w patient, questions answered    DVT Prophylaxis: Lovenox  Diet: DIET GENERAL;  Code Status: Full Code    PT/OT Eval Status: in process    Dispo - inpatient, possible discharge in Melvina Ludwig MD

## 2019-04-19 NOTE — CONSULTS
[address]   Cardiac Electrophysiology Consultation   Date: 4/19/2019  Admit Date:  4/18/2019  Reason for Consultation: Cardiac monitor  Consult Requesting Physician: Christy Hewitt MD     Chief Complaint   Patient presents with    Aphasia     HPI: Perlita North is a 59 y.o. female with history of hypertension, peripheral vascular disease was admitted to the hospital secondary to recurrent aphasia. She was noted to have left carotid stenosis with soft plaque and calcifications. EP was consulted for evaluation of LINQ monitor. Past Medical History:   Diagnosis Date    DDD (degenerative disc disease), cervical     Diverticulitis     Hypertension     PVD (peripheral vascular disease) ([facility] [address]         No past surgical history on file. Allergies   Allergen Reactions    Ciprofloxacin     Sulfa Antibiotics        Social History:  Reviewed. reports that she has been smoking cigarettes. She has been smoking about 1.00 pack per day. She has never used smokeless tobacco. She reports that she drinks alcohol. She reports that she does not use drugs. Family History:  Reviewed. family history includes Coronary Art Dis in her father. No premature CAD. Review of System:  All other systems reviewed except for that noted above. Pertinent negatives and positives are:     Objective      · General: negative for fever, chills   · Ophthalmic ROS: negative for - eye pain or loss of vision  · ENT ROS: negative for - headaches, sore throat   · Respiratory: negative for - cough, sputum  · Cardiovascular: Reviewed in HPI  · Gastrointestinal: negative for - abdominal pain, diarrhea, N/V  · Hematology: negative for - bleeding, blood clots, bruising or jaundice  · Genito-Urinary:  negative for - Dysuria or incontinence  · Musculoskeletal: negative for - Joint swelling, muscle pain  · Neurological: negative for - confusion, dizziness, headaches   · Psychiatric: No anxiety, no depression.   · Dermatological: negative for - rash    Physical Examination:  Vitals:    19 1509   BP: 130/75   Pulse: 71   Resp: 16   Temp: 97.9 °F (36.6 °C)   SpO2: 96%        Intake/Output Summary (Last 24 hours) at 2019 1655  Last data filed at 2019 2338  Gross per 24 hour   Intake 480 ml   Output --   Net 480 ml     In: 240 [P.O.:240]  Out: -    Wt Readings from Last 3 Encounters:   19 194 lb (88 kg)     Temp  Av °F (36.7 °C)  Min: 97.7 °F (36.5 °C)  Max: 98.3 °F (36.8 °C)  Pulse  Av.7  Min: 61  Max: 96  BP  Min: 130/75  Max: 229/115  SpO2  Av.7 %  Min: 96 %  Max: 98 %    · Telemetry: Sinus rhythm   · Constitutional: Alert. Oriented to person, place, and time. No distress. · Head: Normocephalic and atraumatic. · Mouth/Throat: Lips appear moist. Oropharynx is clear and moist.  · Eyes: Conjunctivae normal. EOM are normal.   · Neck: Neck supple. No lymphadenopathy. No rigidity. No JVD present. · Cardiovascular: Normal rate, regular rhythm. Normal S1&S2. Carotid pulse 2+ bilaterally. · Pulmonary/Chest: Bilateral respiratory sounds present. No respiratory accessory muscle use. No wheezes, No rhonchi. · Abdominal: Soft. Normal bowel sounds present. No distension, No tenderness. No splenomegaly. No hernia. · Musculoskeletal: No tenderness. No edema    · Lymphadenopathy: Has no cervical adenopathy. · Neurological: Alert and oriented. Cranial nerve II-XII grossly intact, No gross deficit to touch. · Skin: Skin is warm and dry. No rash, lesions, ulcerations noted. · Psychiatric: No anxiety nor agitation. Labs:  Reviewed. Recent Labs     19  1504      K 4.1   CL 98*   CO2 26   BUN 15   CREATININE <0.5*     No results for input(s): WBC, HGB, HCT, MCV, PLT in the last 72 hours.   Lab Results   Component Value Date    TROPONINI <0.01 2019     No results found for: BNP  No results found for: PROTIME, INR  Lab Results   Component Value Date    CHOL 244 2019    HDL 58

## 2019-04-19 NOTE — PLAN OF CARE
Patient discharge orders placed  Cardiology signed off  Neurology recommendations in place    Full dc summary by primary attending

## 2019-04-19 NOTE — PROGRESS NOTES
Physical Therapy    Facility/Department: [facility] 5T ORTHO/NEURO  Initial Assessment/Treatment Note    NAME: Aicha Chaudhry  : 1955  MRN: [id number]    Date of Service: 2019    Discharge Recommendations:    Aicha Chaudhry scored a 22/24 on the AM-PAC short mobility form. Current research shows that an AM-PAC score of 18 or greater is typically associated with a discharge to the patient's home setting. Based on the patients AM-PAC score and their current functional mobility deficits, it is recommended that the patient have 2-3 sessions per week of Physical Therapy at d/c to increase the patients independence. HOME HEALTH CARE: LEVEL 1 STANDARD    - Initial home health evaluation to occur within 24-48 hours, in patient home   - Therapy to evaluate with goal of regaining prior level of functioning   - Therapy to evaluate if patient has [id number] needs for personal care    PT Equipment Recommendations  Equipment Needed: No    Assessment   Body structures, Functions, Activity limitations: Decreased functional mobility ; Decreased cognition;Decreased ADL status; Decreased endurance;Decreased strength;Decreased balance;Decreased safe awareness; Increased Pain  Assessment: Pt near baseline however demonstrates deficits with maintaining safe balance at this time. No LOB but increased lateral sway at times. Pt attributes this to lethargy due to meds she received recently. Currently pt able to perform bed mobility with modified independence, transfers with supervision, and gt/stair negotiation with SBA/CGA. She does present with decreased safety awareness and awareness of her deficits, which makes her require some assistance initially at home. Continue to follow in order to maximize independence with mobility. Pt reports main concern is difficulty with word finding/speech issues. Rec speech consult.   Discussed with RN who will discuss with MD.  Treatment Diagnosis: impaired gt 2/2 decreased safety Layout: One level  Home Access: Stairs to enter with rails(1 GIOVANNA)  Bathroom Shower/Tub: Tub/Shower unit  Bathroom Equipment: Hand-held shower  Home Equipment: [facility], Electric scooter, Cane, 4 wheeled walker  ADL Assistance: Independent(wears easy to slip on clothing)  Homemaking Assistance: Independent  Ambulation Assistance: Independent(with RW)  Transfer Assistance: Independent  Active : Yes  Occupation: Full time employment  Type of occupation:   Additional Comments: reports hitting her head on the freezer door a few weeks ago. Pt's answers inconsistent throughout treatement; states she uses a RW in her apartment, however also reports she only uses her RW when out for long periods of time  Cognition   Cognition  Overall Cognitive Status: Exceptions  Safety Judgement: Decreased awareness of need for safety(Impulsive)  Cognition Comment: Pt demonstrates decreased awareness of her deficits. Throughout treatment, pt required multiple cues to redirect and to correct errors she has made. Pt tangential with thoughts and perseverates on being in the hospital but would also abruptly change subjects regarding her deficits. Objective    AROM RLE (degrees)  RLE General AROM: Grossly WFL  AROM LLE (degrees)  LLE General AROM: Grossly WFL  Strength RLE  Comment: Grossly >3+/5 throughout as pt able to move funcitonally  Strength LLE  Comment: Grossly >3+/5 throughout as pt able to move funcitonally        Bed mobility  Supine to Sit: Modified independent(HOB elevated)  Transfers  Sit to Stand: Supervision(x2 trials (EOB, commode))  Stand to sit: Supervision(x2 trials (commode, recliner))  Ambulation  Ambulation?: Yes  More Ambulation?: Yes  Ambulation 1  Surface: level tile  Device: No Device  Assistance: Stand by assistance  Quality of Gait: unsteady but no overt LOBs. increased lateral sway throughout. decreased stride length and narrow ALVIN.   Distance: 10'x2 to/from note serves as a discharge note. Cris Fang, Student Physical Therapist    Therapist was present, directed the patient's care, made skilled judgment, and was responsible for assessment and treatment of the patient.     More Collins, PT, DPT  [phone]

## 2019-04-19 NOTE — CARE COORDINATION
2019  [facility])  Clinical Case Management Department    Spoke with patient regarding discharge plans and she is hoping to get back to work as soon as possible. She would like to do outpatient speech therapy upon discharge but needs a place that will be close to home. No DME needs. Patient: Vivian Sheffield  MRN: [id number] / : 1955  ACCT: [de-identified]          Admission Documentation  Attending Provider: Clarisse Elliott MD  Admit date/time: 2019  2:05 PM  Status: Inpatient [101]  Diagnosis: Acute CVA (cerebrovascular accident) [facility])     Readmission within last 30 days:  no     Living Situation  Discharge Planning  Living Arrangements: Alone  Support Systems: Spouse/Significant Other  Potential Assistance Needed: N/A  Type of Home Care Services: None  Patient expects to be discharged to[de-identified] home    Service Assessment       Values / Beliefs  Do you have any ethnic, cultural, sacramental, or spiritual Voodoo needs you would like us to be aware of while you are in the hospital?: No    Advance Directives (For Healthcare)  Pre-existing DNR Comfort Care/DNR Arrest/DNI Order: No  Healthcare Directive: No, patient does not have an advance directive for healthcare treatment  Information on Healthcare Directives Requested: No  Patient Requests Assistance: Yes, will do independently  Advance Directives: Pt. not interested at this time                        Destination  outpatient therapies    Durable Medical Equipment   No needs    Home Health/Skilled Nursing  Services at Discharge: Outpatient Speech Therapy 2x week  Home care at home?  No Provider: SURJIT Provider Phone NA    Therapy Consults  PT evaluation needed?: Yes (Comment)  OT Evalulation Needed?: Yes (Comment)  SLP evaluation needed?: No    Home Medical Care  NA  Pharmacy: none noted  Potential Assistance Purchasing Medications:  Yes  Does patient want to participate in local refill/meds to beds program?: Yes    Goals of Care  Patient expects to be

## 2019-04-19 NOTE — PROGRESS NOTES
Dr. Micki Hayward  Diagnosis: Acute CVA  Subjective  Subjective: Pt in bed upon entry. I do everything! Pain: Denies  Oxygen Therapy  SpO2: 96 %  O2 Device: None (Room air)  Social/Functional History  Social/Functional History  Lives With: Alone  Type of Home: Apartment(1st floor)  Home Layout: One level  Home Access: Stairs to enter with rails(1 GIOVANNA)  Bathroom Shower/Tub: Tub/Shower unit  Bathroom Equipment: Hand-held shower  Home Equipment: [employer], Electric scooter, Cane, 4 wheeled walker  ADL Assistance: Independent(wears easy to slip on clothing)  Homemaking Assistance: Independent  Ambulation Assistance: Independent(with RW)  Transfer Assistance: Independent  Active : Yes  Occupation: Full time employment  Type of occupation:   Additional Comments: reports hitting her head on the freezer door a few weeks ago.  Pt's answers inconsistent throughout treatement; states she uses a RW in her apartment, however also reports she only uses her RW when out for long periods of time       Objective   Vision: Impaired  Vision Exceptions: Wears glasses for reading  Hearing: Within functional limits    Orientation  Overall Orientation Status: Within Functional Limits     Balance  Sitting Balance: Independent  Standing Balance: Stand by assistance(to supervision)  Standing Balance  Time: ~4 min  Activity: bathroom mobility/activity, walk in florentino  Functional Mobility  Functional - Mobility Device: No device  Activity: To/from bathroom  Assist Level: Stand by assistance  Toilet Transfers  Toilet - Technique: Ambulating  Equipment Used: Standard toilet(grab bar)  Toilet Transfer: Stand by assistance  ADL  Grooming: Independent  LE Dressing: Modified independent   Toileting: Independent  Tone RUE  RUE Tone: Normotonic  Tone LUE  LUE Tone: Normotonic  Coordination  Movements Are Fluid And Coordinated: Yes     Bed mobility  Supine to Sit: Modified independent(HOB elevated)  Transfers  Stand Step Transfers: Stand by assistance     Cognition  Overall Cognitive Status: Exceptions  Safety Judgement: Decreased awareness of need for safety(Impulsive)  Cognition Comment: Pt demonstrates decreased awareness of her deficits. Throughout treatment, pt required multiple cues to redirect and to correct errors she has made. Pt tangential with thoughts and perseverates on being in the hospital but would also abruptly change subjects regarding her deficits. LUE AROM (degrees)  LUE AROM : WFL  RUE AROM (degrees)  RUE AROM : WFL                  Treatment included ADL and transfer training. Plan   Plan  Times per week: 2-5  Times per day: Daily  Current Treatment Recommendations: Functional Mobility Training, Endurance Training, Self-Care / ADL, Cognitive/Perceptual Training     If patient is discharged prior to next treatment, this note will serve as the discharge. AM-PAC Score        AM-PAC Inpatient Daily Activity Raw Score: 22  AM-PAC Inpatient ADL T-Scale Score : 47.1  ADL Inpatient CMS 0-100% Score: 25.8  ADL Inpatient CMS G-Code Modifier : CJ    Goals                             No goals met  Short term goals  Time Frame for Short term goals: Discharge  Short term goal 1: Transfer to/from toilet with modified independence  Short term goal 2: Modified independence for functional room mobility/mobility x5 min  Patient Goals   Patient goals : Go home. Work.        Therapy Time   Individual Concurrent Group Co-treatment   Time In 1213         Time Out 1353         Minutes 38         Timed Code Treatment Minutes: 28 Minutes    Total Treatment [address], OTR/L [id number]

## 2019-04-20 NOTE — DISCHARGE SUMMARY
Hospital Medicine Discharge Summary    Patient ID: Lukasz Machuca      Patient's PCP: Renny Wylie MD    Admit Date: 4/18/2019     Discharge Date: 4/19/2019      Admitting Physician: Tamika Harris MD     Discharge Physician: Marty Andrews MD     Discharge Diagnoses: Active Hospital Problems    Diagnosis Date Noted    Primary hypertension [I10] 04/19/2019    Hyperlipemia [E78.5] 04/19/2019    Tobacco abuse [Z72.0] 04/19/2019    Acute CVA (cerebrovascular accident[facility]) [I63.9] 04/18/2019       The patient was seen and examined on day of discharge and this discharge summary is in conjunction with any daily progress note from day of discharge. Hospital Course:     Admitted with expressive aphasia  Diagnosed with left hemispheric CVA  Was seen by neurology  Cardiology also consulted. No indication was seen for arrhythmia workup  Will be discharged home with prescriptions as below  Smoking cessation advice given        Physical Exam Performed:     /75   Pulse 71   Temp 97.9 °F (36.6 °C) (Oral)   Resp 16   Ht 5' 2\" (1.575 m)   Wt 194 lb (88 kg)   SpO2 96%   BMI 35.48 kg/m²       General appearance:  No apparent distress, appears stated age and cooperative. HEENT:  Normal cephalic, atraumatic without obvious deformity. Pupils equal, round, and reactive to light. Extra ocular muscles intact. Conjunctivae/corneas clear. Neck: Supple, with full range of motion. No jugular venous distention. Trachea midline. Respiratory:  Normal respiratory effort. Clear to auscultation, bilaterally without Rales/Wheezes/Rhonchi. Cardiovascular:  Regular rate and rhythm with normal S1/S2 without murmurs, rubs or gallops. Abdomen: Soft, non-tender, non-distended with normal bowel sounds. Musculoskeletal:  No clubbing, cyanosis or edema bilaterally. Full range of motion without deformity. Skin: Skin color, texture, turgor normal.  No rashes or lesions.   Neurologic:  Neurovascularly intact without the cavernous segments. The anterior cerebral arteries and the right middle cerebral artery patent. The left    M1 segment is patent. However, there is occlusion or high-grade stenosis of left M2 branch. POSTERIOR CIRCULATION: The intracranial vertebral arteries, basilar artery and branch vessels are patent. INTRACRANIAL VENOUS SYSTEM: No evidence for intracranial venous thrombosis. INTRACRANIAL HEMORRHAGE: None. VENTRICLES: Normal in size and configuration for age. BRAIN PARENCHYMA: No evidence for large territorial acute infarction. No intracranial mass effect. Subcentimeter low attenuation foci involving left cerebral white matter compatible with age indeterminate ischemia unchanged compared to the prior    noncontrast head CT. SKULL: No destructive osseous process or fracture. PARANASAL SINUSES / MASTOIDS: No acute sinusitis or mastoiditis. EXTRACRANIAL SOFT TISSUES: Normal.      IMPRESSION:      1. Occlusion or severe stenosis of left M2 branch origin   2. No large territorial acute infarction      CTA HEAD W CONTRAST   Final Result      1. Tortuous bilateral internal carotid arteries with 30% stenosis of the left internal carotid artery bulbar segment and less than 30% stenosis of the right internal carotid artery by NASCET criteria   2. Dominant right vertebral artery. No significant vertebral artery stenosis. CTA HEAD:      ANTERIOR CIRCULATION: The intracranial internal carotid arteries are patent. There is calcification contributing to mild to moderate narrowing of the cavernous segments. The anterior cerebral arteries and the right middle cerebral artery patent. The left    M1 segment is patent. However, there is occlusion or high-grade stenosis of left M2 branch. POSTERIOR CIRCULATION: The intracranial vertebral arteries, basilar artery and branch vessels are patent. INTRACRANIAL VENOUS SYSTEM: No evidence for intracranial venous thrombosis. R-3Normal              Details   hydrochlorothiazide (HYDRODIURIL) 25 MG tablet Take 25 mg by mouth dailyHistorical Med             Time Spent on discharge is more than 45 minutes in the examination, evaluation, counseling and review of medications and discharge plan. Signed:    Belkis Lynch MD   4/20/2019      Thank you Zeb Allan MD for the opportunity to be involved in this patient's care. If you have any questions or concerns please feel free to contact me at [phone].

## 2019-04-22 ENCOUNTER — TELEPHONE (OUTPATIENT)
Dept: INTERNAL MEDICINE CLINIC | Age: 64
End: 2019-04-22

## 2019-04-22 NOTE — TELEPHONE ENCOUNTER
Pt stated the following:  Needs a New appointment. Pt was discharge from [facility]. on 4/19/19.   Pt was referred by Hospital to Dr. Elyse Hsu  Please call

## 2022-09-14 DIAGNOSIS — R93.5 ABNORMAL CT OF THE ABDOMEN: ICD-10-CM

## 2022-09-14 DIAGNOSIS — I70.8 ATHEROSCLEROSIS OF AORTIC BIFURCATION AND COMMON ILIAC ARTERIES (HCC): Primary | ICD-10-CM

## 2022-09-14 DIAGNOSIS — I70.0 ATHEROSCLEROSIS OF AORTIC BIFURCATION AND COMMON ILIAC ARTERIES (HCC): Primary | ICD-10-CM

## 2023-03-30 ENCOUNTER — APPOINTMENT (RX ONLY)
Dept: URBAN - METROPOLITAN AREA CLINIC 170 | Facility: CLINIC | Age: 68
Setting detail: DERMATOLOGY
End: 2023-03-30

## 2023-03-30 DIAGNOSIS — L13.0 DERMATITIS HERPETIFORMIS: ICD-10-CM

## 2023-03-30 PROBLEM — L30.9 DERMATITIS, UNSPECIFIED: Status: ACTIVE | Noted: 2023-03-30

## 2023-03-30 PROCEDURE — ? BIOPSY BY PUNCH METHOD FOR DIF

## 2023-03-30 PROCEDURE — 11104 PUNCH BX SKIN SINGLE LESION: CPT

## 2023-03-30 PROCEDURE — ? ADDITIONAL NOTES

## 2023-03-30 PROCEDURE — 11105 PUNCH BX SKIN EA SEP/ADDL: CPT

## 2023-03-30 PROCEDURE — ? BIOPSY BY PUNCH METHOD

## 2023-03-30 ASSESSMENT — LOCATION SIMPLE DESCRIPTION DERM
LOCATION SIMPLE: RIGHT UPPER BACK
LOCATION SIMPLE: RIGHT BACK
LOCATION SIMPLE: LEFT KNEE

## 2023-03-30 ASSESSMENT — LOCATION DETAILED DESCRIPTION DERM
LOCATION DETAILED: RIGHT SUPERIOR UPPER BACK
LOCATION DETAILED: LEFT KNEE
LOCATION DETAILED: RIGHT SUPERIOR LATERAL UPPER BACK

## 2023-03-30 ASSESSMENT — LOCATION ZONE DERM
LOCATION ZONE: LEG
LOCATION ZONE: TRUNK

## 2023-03-30 NOTE — PROCEDURE: BIOPSY BY PUNCH METHOD
Detail Level: Detailed
Was A Bandage Applied: Yes
Punch Size In Mm: 4
Size Of Lesion In Cm (Optional): 0
Depth Of Punch Biopsy: dermis
Biopsy Type: H and E
Anesthesia Type: 2% lidocaine with epinephrine
Anesthesia Volume In Cc (Will Not Render If 0): 1
Hemostasis: Aluminum Chloride and Electrocautery
Epidermal Sutures: 4-0 Ethilon
Wound Care: Bacitracin
Dressing: bandage
Suture Removal: 14 days
Patient Will Remove Sutures At Home?: No
Lab: -102
Lab Facility: 3
Consent: Written consent was obtained and risks were reviewed including but not limited to scarring, infection, bleeding, scabbing, incomplete removal, nerve damage and allergy to anesthesia.
Post-Care Instructions: I reviewed with the patient in detail post-care instructions. Patient is to keep the biopsy site dry overnight, and then apply bacitracin twice daily until healed. Patient may apply hydrogen peroxide soaks to remove any crusting.
Home Suture Removal Text: Patient was provided a home suture removal kit and will remove their sutures at home.  If they have any questions or difficulties they will call the office.
Notification Instructions: Patient will be notified of biopsy results. However, patient instructed to call the office if not contacted within 2 weeks.
Billing Type: Third-Party Bill
Information: Selecting Yes will display possible errors in your note based on the variables you have selected. This validation is only offered as a suggestion for you. PLEASE NOTE THAT THE VALIDATION TEXT WILL BE REMOVED WHEN YOU FINALIZE YOUR NOTE. IF YOU WANT TO FAX A PRELIMINARY NOTE YOU WILL NEED TO TOGGLE THIS TO 'NO' IF YOU DO NOT WANT IT IN YOUR FAXED NOTE.

## 2023-03-30 NOTE — HPI: RASH
What Type Of Note Output Would You Prefer (Optional)?: Bullet Format
How Severe Is Your Rash?: mild
Is This A New Presentation, Or A Follow-Up?: Rash
Additional History: Patient seen pcp was diagnosed with shingles and scabbies and she only has taking valtrex which didnt help it .She doesn?t want to take the ivermectin, she has many food allergies which sometimes breaks out in hives. She used triam and she said made worse

## 2023-03-30 NOTE — PROCEDURE: BIOPSY BY PUNCH METHOD FOR DIF
Detail Level: Detailed
Was A Bandage Applied: Yes
Punch Size In Mm: 4
Size Of Lesion In Cm (Optional): 0
Depth Of Punch Biopsy: dermis
Biopsy Type: DIF (perilesional)
Anesthesia Type: 1% lidocaine with epinephrine
Anesthesia Volume In Cc (Will Not Render If 0): 0.5
Hemostasis: None
Epidermal Sutures: 4-0 Ethilon
Wound Care: Petrolatum
Dressing: bandage
Suture Removal: 14 days
Patient Will Remove Sutures At Home?: No
Lab: -102
Lab Facility: 3
Consent: Written consent was obtained and risks were reviewed including but not limited to scarring, infection, bleeding, scabbing, incomplete removal, nerve damage and allergy to anesthesia.
Post-Care Instructions: I reviewed with the patient in detail post-care instructions. Patient is to keep the biopsy site dry overnight, and then apply bacitracin twice daily until healed. Patient may apply hydrogen peroxide soaks to remove any crusting.
Home Suture Removal Text: Patient was provided a home suture removal kit and will remove their sutures at home.  If they have any questions or difficulties they will call the office.
Notification Instructions: Patient will be notified of biopsy results. However, patient instructed to call the office if not contacted within 2 weeks.
Billing Type: Third-Party Bill

## 2023-04-14 ENCOUNTER — APPOINTMENT (RX ONLY)
Dept: URBAN - METROPOLITAN AREA CLINIC 170 | Facility: CLINIC | Age: 68
Setting detail: DERMATOLOGY
End: 2023-04-14

## 2023-04-14 DIAGNOSIS — L20.89 OTHER ATOPIC DERMATITIS: ICD-10-CM

## 2023-04-14 PROBLEM — L20.84 INTRINSIC (ALLERGIC) ECZEMA: Status: ACTIVE | Noted: 2023-04-14

## 2023-04-14 PROCEDURE — ? SUTURE REMOVAL (GLOBAL PERIOD)

## 2023-04-14 ASSESSMENT — LOCATION DETAILED DESCRIPTION DERM
LOCATION DETAILED: RIGHT SUPERIOR UPPER BACK
LOCATION DETAILED: LEFT KNEE

## 2023-04-14 ASSESSMENT — LOCATION ZONE DERM
LOCATION ZONE: LEG
LOCATION ZONE: TRUNK

## 2023-04-14 ASSESSMENT — LOCATION SIMPLE DESCRIPTION DERM
LOCATION SIMPLE: RIGHT UPPER BACK
LOCATION SIMPLE: LEFT KNEE

## 2023-04-14 NOTE — PROCEDURE: SUTURE REMOVAL (GLOBAL PERIOD)
Detail Level: Detailed
Add 95038 Cpt? (Important Note: In 2017 The Use Of 70696 Is Being Tracked By Cms To Determine Future Global Period Reimbursement For Global Periods): no

## 2025-01-28 ENCOUNTER — APPOINTMENT (OUTPATIENT)
Dept: URBAN - METROPOLITAN AREA CLINIC 170 | Facility: CLINIC | Age: 70
Setting detail: DERMATOLOGY
End: 2025-01-28

## 2025-01-28 DIAGNOSIS — L27.0 GENERALIZED SKIN ERUPTION DUE TO DRUGS AND MEDICAMENTS TAKEN INTERNALLY: ICD-10-CM | Status: INADEQUATELY CONTROLLED

## 2025-01-28 DIAGNOSIS — T300 BLISTERS, EPIDERMAL LOSS [SECOND DEGREE], UNSPECIFIED SITE: ICD-10-CM | Status: IMPROVED

## 2025-01-28 PROBLEM — L03.114 CELLULITIS OF LEFT UPPER LIMB: Status: ACTIVE | Noted: 2025-01-28

## 2025-01-28 PROCEDURE — ? PRESCRIPTION

## 2025-01-28 PROCEDURE — ? ADDITIONAL NOTES

## 2025-01-28 PROCEDURE — ? PHOTO-DOCUMENTATION

## 2025-01-28 PROCEDURE — ? TREATMENT REGIMEN

## 2025-01-28 PROCEDURE — ? COUNSELING

## 2025-01-28 PROCEDURE — 99213 OFFICE O/P EST LOW 20 MIN: CPT

## 2025-01-28 RX ORDER — MUPIROCIN 20 MG/G
OINTMENT TOPICAL
Qty: 22 | Refills: 2 | Status: ERX | COMMUNITY
Start: 2025-01-28

## 2025-01-28 RX ADMIN — MUPIROCIN: 20 OINTMENT TOPICAL at 00:00

## 2025-01-28 ASSESSMENT — LOCATION DETAILED DESCRIPTION DERM
LOCATION DETAILED: LEFT RADIAL DORSAL HAND
LOCATION DETAILED: LEFT ANTERIOR PROXIMAL UPPER ARM
LOCATION DETAILED: RIGHT ANTERIOR DISTAL UPPER ARM
LOCATION DETAILED: RIGHT SUPERIOR UPPER BACK

## 2025-01-28 ASSESSMENT — LOCATION SIMPLE DESCRIPTION DERM
LOCATION SIMPLE: LEFT HAND
LOCATION SIMPLE: RIGHT UPPER BACK
LOCATION SIMPLE: LEFT UPPER ARM
LOCATION SIMPLE: RIGHT UPPER ARM

## 2025-01-28 ASSESSMENT — LOCATION ZONE DERM
LOCATION ZONE: TRUNK
LOCATION ZONE: HAND
LOCATION ZONE: ARM

## 2025-01-28 NOTE — PROCEDURE: ADDITIONAL NOTES
Additional Notes: Bleeds easily at outbreak site because of blood thinners\\nEMA photo of photo on patient’s phone
Render Risk Assessment In Note?: no
Detail Level: Detailed
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
## 2025-01-28 NOTE — PROCEDURE: TREATMENT REGIMEN
Plan: Patient has been working with PCP regarding medications, but has to remain on blood thinners\\nPatient has been adjusting diet (limiting gluten)\\nPatient has not been able to get appointment with rheumatologist
Detail Level: Detailed

## 2025-09-02 ENCOUNTER — TRANSCRIBE ORDERS (OUTPATIENT)
Dept: ADMINISTRATIVE | Age: 70
End: 2025-09-02

## 2025-09-02 DIAGNOSIS — R10.31 RLQ ABDOMINAL PAIN: Primary | ICD-10-CM
